# Patient Record
Sex: MALE | Race: WHITE | NOT HISPANIC OR LATINO | URBAN - METROPOLITAN AREA
[De-identification: names, ages, dates, MRNs, and addresses within clinical notes are randomized per-mention and may not be internally consistent; named-entity substitution may affect disease eponyms.]

---

## 2024-05-03 ENCOUNTER — OFFICE (OUTPATIENT)
Dept: URBAN - METROPOLITAN AREA CLINIC 11 | Facility: CLINIC | Age: 69
End: 2024-05-03

## 2024-05-03 VITALS
SYSTOLIC BLOOD PRESSURE: 185 MMHG | HEIGHT: 69 IN | WEIGHT: 194 LBS | OXYGEN SATURATION: 98 % | HEART RATE: 68 BPM | DIASTOLIC BLOOD PRESSURE: 88 MMHG | DIASTOLIC BLOOD PRESSURE: 85 MMHG

## 2024-05-03 DIAGNOSIS — Z79.1 LONG TERM (CURRENT) USE OF NON-STEROIDAL ANTI-INFLAMMATORIES: ICD-10-CM

## 2024-05-03 DIAGNOSIS — Z72.0 TOBACCO USE: ICD-10-CM

## 2024-05-03 DIAGNOSIS — K21.9 GASTRO-ESOPHAGEAL REFLUX DISEASE WITHOUT ESOPHAGITIS: ICD-10-CM

## 2024-05-03 DIAGNOSIS — R19.5 OTHER FECAL ABNORMALITIES: ICD-10-CM

## 2024-05-03 PROCEDURE — 99204 OFFICE O/P NEW MOD 45 MIN: CPT | Performed by: STUDENT IN AN ORGANIZED HEALTH CARE EDUCATION/TRAINING PROGRAM

## 2024-05-03 RX ORDER — SODIUM PICOSULFATE, MAGNESIUM OXIDE, AND ANHYDROUS CITRIC ACID 12; 3.5; 1 G/175ML; G/175ML; MG/175ML
LIQUID ORAL
Qty: 1 | Refills: 0 | Status: COMPLETED
Start: 2024-05-03 | End: 2024-06-04

## 2024-05-03 NOTE — SERVICEHPINOTES
Seng Hutchinson   is a   68   year old  male   with PMH significant for PVD, HTN, HLD, back pain who presents to [place] [id number] for diagnostic colonoscopy due to positive Cologuard test 1 month ago.david hernandez   Clinic Visit 5/3/24:
Terrei is with his wife today in clinic and reports never having had a colonoscopy. This was his first Cologuard test. He   Endorses 2-3 loose bowel movements per day without hematochezia or melena.  He denies nausea, vomiting, or abdominal pain.  He denies any unintentional weight loss.  He takes 40 mg Protonix daily and reports he has been doing this for over 10 years.  He denies any dysphagia.  He takes 2 naproxen about 2 times per week for back pain.  He was previously on Excedrin migraine for years and stopped taking that about 3 months ago.

## 2024-05-03 NOTE — SERVICENOTES
Patient has positive Cologuard test so will schedule him for diagnostic colonoscopy.  We will also schedule him for EGD as well due to chronic GERD for Barretts esophagus screening.  His symptoms are well controlled on Protonix at this time.  He can follow up after his procedure as needed depending on testing results.  All questions addressed.

## 2024-06-04 ENCOUNTER — AMBULATORY SURGICAL CENTER (OUTPATIENT)
Dept: URBAN - METROPOLITAN AREA SURGERY 2 | Facility: SURGERY | Age: 69
End: 2024-06-04
Payer: MEDICARE

## 2024-06-04 ENCOUNTER — OFFICE (OUTPATIENT)
Dept: URBAN - METROPOLITAN AREA PATHOLOGY 12 | Facility: PATHOLOGY | Age: 69
End: 2024-06-04
Payer: MEDICARE

## 2024-06-04 VITALS
OXYGEN SATURATION: 94 % | HEART RATE: 65 BPM | DIASTOLIC BLOOD PRESSURE: 58 MMHG | SYSTOLIC BLOOD PRESSURE: 169 MMHG | DIASTOLIC BLOOD PRESSURE: 41 MMHG | SYSTOLIC BLOOD PRESSURE: 109 MMHG | RESPIRATION RATE: 18 BRPM | HEART RATE: 65 BPM | DIASTOLIC BLOOD PRESSURE: 41 MMHG | DIASTOLIC BLOOD PRESSURE: 62 MMHG | SYSTOLIC BLOOD PRESSURE: 117 MMHG | WEIGHT: 191 LBS | RESPIRATION RATE: 16 BRPM | RESPIRATION RATE: 16 BRPM | DIASTOLIC BLOOD PRESSURE: 41 MMHG | HEART RATE: 54 BPM | OXYGEN SATURATION: 97 % | SYSTOLIC BLOOD PRESSURE: 131 MMHG | RESPIRATION RATE: 17 BRPM | HEIGHT: 69 IN | DIASTOLIC BLOOD PRESSURE: 80 MMHG | OXYGEN SATURATION: 97 % | SYSTOLIC BLOOD PRESSURE: 131 MMHG | SYSTOLIC BLOOD PRESSURE: 131 MMHG | DIASTOLIC BLOOD PRESSURE: 62 MMHG | OXYGEN SATURATION: 94 % | DIASTOLIC BLOOD PRESSURE: 58 MMHG | RESPIRATION RATE: 18 BRPM | HEART RATE: 65 BPM | RESPIRATION RATE: 17 BRPM | WEIGHT: 191 LBS | RESPIRATION RATE: 16 BRPM | TEMPERATURE: 97.7 F | HEART RATE: 60 BPM | HEART RATE: 54 BPM | HEIGHT: 69 IN | DIASTOLIC BLOOD PRESSURE: 80 MMHG | SYSTOLIC BLOOD PRESSURE: 109 MMHG | WEIGHT: 191 LBS | RESPIRATION RATE: 17 BRPM | TEMPERATURE: 98.1 F | DIASTOLIC BLOOD PRESSURE: 80 MMHG | TEMPERATURE: 98.1 F | HEART RATE: 60 BPM | SYSTOLIC BLOOD PRESSURE: 117 MMHG | TEMPERATURE: 98.1 F | TEMPERATURE: 97.7 F | SYSTOLIC BLOOD PRESSURE: 117 MMHG | DIASTOLIC BLOOD PRESSURE: 62 MMHG | HEART RATE: 56 BPM | SYSTOLIC BLOOD PRESSURE: 109 MMHG | HEIGHT: 69 IN | TEMPERATURE: 97.7 F | HEART RATE: 54 BPM | OXYGEN SATURATION: 97 % | HEART RATE: 60 BPM | DIASTOLIC BLOOD PRESSURE: 58 MMHG | RESPIRATION RATE: 18 BRPM | SYSTOLIC BLOOD PRESSURE: 169 MMHG | OXYGEN SATURATION: 94 % | HEART RATE: 56 BPM | HEART RATE: 56 BPM | SYSTOLIC BLOOD PRESSURE: 169 MMHG

## 2024-06-04 DIAGNOSIS — D12.5 BENIGN NEOPLASM OF SIGMOID COLON: ICD-10-CM

## 2024-06-04 DIAGNOSIS — K29.50 UNSPECIFIED CHRONIC GASTRITIS WITHOUT BLEEDING: ICD-10-CM

## 2024-06-04 DIAGNOSIS — K62.1 RECTAL POLYP: ICD-10-CM

## 2024-06-04 DIAGNOSIS — K64.8 OTHER HEMORRHOIDS: ICD-10-CM

## 2024-06-04 DIAGNOSIS — D12.8 BENIGN NEOPLASM OF RECTUM: ICD-10-CM

## 2024-06-04 DIAGNOSIS — B96.81 HELICOBACTER PYLORI [H. PYLORI] AS THE CAUSE OF DISEASES CLA: ICD-10-CM

## 2024-06-04 DIAGNOSIS — K44.9 DIAPHRAGMATIC HERNIA WITHOUT OBSTRUCTION OR GANGRENE: ICD-10-CM

## 2024-06-04 DIAGNOSIS — K21.9 GASTRO-ESOPHAGEAL REFLUX DISEASE WITHOUT ESOPHAGITIS: ICD-10-CM

## 2024-06-04 DIAGNOSIS — K57.30 DIVERTICULOSIS OF LARGE INTESTINE WITHOUT PERFORATION OR ABS: ICD-10-CM

## 2024-06-04 DIAGNOSIS — R19.5 OTHER FECAL ABNORMALITIES: ICD-10-CM

## 2024-06-04 DIAGNOSIS — K63.5 POLYP OF COLON: ICD-10-CM

## 2024-06-04 DIAGNOSIS — K31.89 OTHER DISEASES OF STOMACH AND DUODENUM: ICD-10-CM

## 2024-06-04 DIAGNOSIS — Z12.11 ENCOUNTER FOR SCREENING FOR MALIGNANT NEOPLASM OF COLON: ICD-10-CM

## 2024-06-04 DIAGNOSIS — K64.4 RESIDUAL HEMORRHOIDAL SKIN TAGS: ICD-10-CM

## 2024-06-04 DIAGNOSIS — K29.30 CHRONIC SUPERFICIAL GASTRITIS WITHOUT BLEEDING: ICD-10-CM

## 2024-06-04 PROCEDURE — 88342 IMHCHEM/IMCYTCHM 1ST ANTB: CPT | Performed by: STUDENT IN AN ORGANIZED HEALTH CARE EDUCATION/TRAINING PROGRAM

## 2024-06-04 PROCEDURE — 88313 SPECIAL STAINS GROUP 2: CPT | Performed by: STUDENT IN AN ORGANIZED HEALTH CARE EDUCATION/TRAINING PROGRAM

## 2024-06-04 PROCEDURE — 45380 COLONOSCOPY AND BIOPSY: CPT | Mod: 59 | Performed by: STUDENT IN AN ORGANIZED HEALTH CARE EDUCATION/TRAINING PROGRAM

## 2024-06-04 PROCEDURE — 45385 COLONOSCOPY W/LESION REMOVAL: CPT | Mod: PT,KX | Performed by: STUDENT IN AN ORGANIZED HEALTH CARE EDUCATION/TRAINING PROGRAM

## 2024-06-04 PROCEDURE — 43239 EGD BIOPSY SINGLE/MULTIPLE: CPT | Mod: 51 | Performed by: STUDENT IN AN ORGANIZED HEALTH CARE EDUCATION/TRAINING PROGRAM

## 2024-06-04 PROCEDURE — 45385 COLONOSCOPY W/LESION REMOVAL: CPT | Mod: KX,PT | Performed by: STUDENT IN AN ORGANIZED HEALTH CARE EDUCATION/TRAINING PROGRAM

## 2024-06-04 PROCEDURE — 45380 COLONOSCOPY AND BIOPSY: CPT | Mod: 59,KX,PT | Performed by: STUDENT IN AN ORGANIZED HEALTH CARE EDUCATION/TRAINING PROGRAM

## 2024-06-04 PROCEDURE — 45380 COLONOSCOPY AND BIOPSY: CPT | Mod: KX,59,PT | Performed by: STUDENT IN AN ORGANIZED HEALTH CARE EDUCATION/TRAINING PROGRAM

## 2024-06-04 PROCEDURE — 45385 COLONOSCOPY W/LESION REMOVAL: CPT | Performed by: STUDENT IN AN ORGANIZED HEALTH CARE EDUCATION/TRAINING PROGRAM

## 2024-06-04 PROCEDURE — 88305 TISSUE EXAM BY PATHOLOGIST: CPT | Performed by: STUDENT IN AN ORGANIZED HEALTH CARE EDUCATION/TRAINING PROGRAM

## 2024-06-04 NOTE — SERVICEHPINOTES
Seng Hutchinson is a 68 year old male with PMH significant for PVD, HTN, HLD, back pain who initially presented to [facility]  for diagnostic colonoscopy due to positive Cologuard test 1 month ago.Clinic Visit 5/3/24:Terrie is with his wife today in clinic and reports never having had a colonoscopy. This was his first Cologuard test. He   Endorses 2-3 loose bowel movements per day without hematochezia or melena.  He denies nausea, vomiting, or abdominal pain.  He denies any unintentional weight loss.  He takes 40 mg Protonix daily and reports he has been doing this for over 10 years.  He denies any dysphagia.  He takes 2 naproxen about 2 times per week for back pain.  He was previously on Excedrin migraine for years and stopped taking that about 3 months ago.
br
br   EGD/colonoscopy 6/4/24:  
br
pt not on blood thinners, PSH includes GB removal and appendetomy, no FHx of colon cancer, last BM was clear, ready for procedure

## 2024-06-04 NOTE — SERVICENOTES
likely repeat in 6-12 months due to piecemeal of large rectal polyp
long procedure due to many polyps

## 2024-06-07 LAB
GASTRO ONE PATHOLOGY: PDF REPORT: (no result)